# Patient Record
Sex: MALE | Race: WHITE | URBAN - NONMETROPOLITAN AREA
[De-identification: names, ages, dates, MRNs, and addresses within clinical notes are randomized per-mention and may not be internally consistent; named-entity substitution may affect disease eponyms.]

---

## 2022-06-29 ENCOUNTER — OFFICE VISIT (OUTPATIENT)
Dept: ENT CLINIC | Age: 52
End: 2022-06-29
Payer: COMMERCIAL

## 2022-06-29 VITALS
OXYGEN SATURATION: 98 % | HEART RATE: 68 BPM | DIASTOLIC BLOOD PRESSURE: 80 MMHG | SYSTOLIC BLOOD PRESSURE: 132 MMHG | WEIGHT: 315 LBS | RESPIRATION RATE: 18 BRPM

## 2022-06-29 DIAGNOSIS — H93.13 TINNITUS, BILATERAL: ICD-10-CM

## 2022-06-29 DIAGNOSIS — R42 CERVICAL VERTIGO: Primary | ICD-10-CM

## 2022-06-29 PROCEDURE — 3017F COLORECTAL CA SCREEN DOC REV: CPT | Performed by: OTOLARYNGOLOGY

## 2022-06-29 PROCEDURE — 1036F TOBACCO NON-USER: CPT | Performed by: OTOLARYNGOLOGY

## 2022-06-29 PROCEDURE — G8421 BMI NOT CALCULATED: HCPCS | Performed by: OTOLARYNGOLOGY

## 2022-06-29 PROCEDURE — 99204 OFFICE O/P NEW MOD 45 MIN: CPT | Performed by: OTOLARYNGOLOGY

## 2022-06-29 PROCEDURE — G8427 DOCREV CUR MEDS BY ELIG CLIN: HCPCS | Performed by: OTOLARYNGOLOGY

## 2022-06-29 ASSESSMENT — ENCOUNTER SYMPTOMS
ALLERGIC/IMMUNOLOGIC NEGATIVE: 1
EYES NEGATIVE: 1
GASTROINTESTINAL NEGATIVE: 1
RESPIRATORY NEGATIVE: 1

## 2022-06-29 NOTE — PROGRESS NOTES
Review of Systems   Constitutional: Negative. HENT: Negative. Eyes: Negative. Respiratory: Negative. Cardiovascular: Negative. Gastrointestinal: Negative. Endocrine: Negative. Genitourinary: Negative. Musculoskeletal: Negative. Skin: Negative. Allergic/Immunologic: Negative. Neurological: Positive for dizziness and light-headedness. Negative for tremors, seizures, syncope, facial asymmetry, speech difficulty, weakness, numbness and headaches. Hematological: Negative. Psychiatric/Behavioral: Negative.

## 2022-06-29 NOTE — PROGRESS NOTES
[facility]  [address] [facility] [address]  Dept: MD Jillian Sampson 46 y.o. male     Patient presents with a chief complaint of Dizziness (symptoms off and on since 2016. Previously seeing Dr Chad Hayden. Patient states that symptoms have increased since Feb of this year. Symptoms increase with weather changes and rainy days. Evenings are also worse. ) and Temporomandibular Joint Pain (symptoms in right jaw, tingling in right temple)       /80   Pulse 68   Resp 18   Wt (!) 373 lb (169.2 kg)   SpO2 98%       History of Presenting Illness: The patient/caregiver reports a history of complaint with the following features: Onset: started several years ago  Timing: occurs with weather changed  Duration: brief  Quality: feels movement sensation, feels like fluid draining in ears, fluctuating hearing in both ears, but usually on left  Location: both ears  Severity: pain none  Alleviating factors: nothing gives relief, did not tolerate a diuretic due to worsened vertigo  Aggravating factors: changes in weather  Associated factors: he reports changes in vision, and fluttering or tingling sensation in right temple, associated nausea    He has seen other ENT who suggested Meniere's disease, but has not tolerated diuretic therapy or benefited from sodium dietary restriction. He reports a history of cervical injury and reduced range of motion. Review of systems covering 10 systems is reviewed as staff entry in other note and pertinent positives and negatives noted. No past medical history on file. No current outpatient medications on file. No Known Allergies   No past surgical history on file.    Social History     Socioeconomic History    Marital status: Unknown     Spouse name: Not on file    Number of children: Not on file    Years of education: Not on file    Highest education level: Not on file   Occupational History    Not on file   Tobacco Use    Smoking status: Never Smoker    Smokeless tobacco: Never Used   Substance and Sexual Activity    Alcohol use: Not on file    Drug use: Not on file    Sexual activity: Not on file   Other Topics Concern    Not on file   Social History Narrative    Not on file     Social Determinants of Health     Financial Resource Strain:     Difficulty of Paying Living Expenses: Not on file   Food Insecurity:     Worried About Running Out of Food in the Last Year: Not on file    Maranda of Food in the Last Year: Not on file   Transportation Needs:     Lack of Transportation (Medical): Not on file    Lack of Transportation (Non-Medical): Not on file   Physical Activity:     Days of Exercise per Week: Not on file    Minutes of Exercise per Session: Not on file   Stress:     Feeling of Stress : Not on file   Social Connections:     Frequency of Communication with Friends and Family: Not on file    Frequency of Social Gatherings with Friends and Family: Not on file    Attends Christianity Services: Not on file    Active Member of [address] [place] [address] or Organizations: Not on file    Attends Club or Organization Meetings: Not on file    Marital Status: Not on file   Intimate Partner Violence:     Fear of Current or Ex-Partner: Not on file    Emotionally Abused: Not on file    Physically Abused: Not on file    Sexually Abused: Not on file   Housing Stability:     Unable to Pay for Housing in the Last Year: Not on file    Number of [place] in the Last Year: Not on file    Unstable Housing in the Last Year: Not on file     No family history on file.      PHYSICAL EXAM:    The patient was examined today 6/29/2022 with findings as follows:    CONSTITUTIONAL:    General Appearance: well-appearing, nontoxic, alert, no acute distress     Communication: understanding at normal conversational tones, normal voicing, speech intelligible    HEAD/FACE:    Head: atraumatic, normocephalic, no lesions    Facial Inspection: no lesions, healthy skin    Facial Strength: motor strength normal, symmetric strength, symmetric movement, motor strength    Sinuses: no sinus tenderness    Salivary Glands: no enlargements of parotid glands, no tenderness of parotid glands, no masses of parotid glands, clear salivary flow on palpation from Stensen's ducts, no duct stones of Stensen's duct, no enlargement of submandibular glands, no tenderness of submandibular glands, no masses of submandibular glands, clear salivary flow from [place]'s ducts, no stones of Americo's ducts    Temporomandibular Joint: no crepitus with motion, no tenderness on palpation, no trismus, motion symmetric    EYES:    Pupils: PERRLA, extra-ocular movements intact, no nystagmus, sclera white, no redness of eyes, no watering of eyes    EARS:    Bilateral External Ears: no pits, no tags    Right External Ear: normally formed, no lesions, no mastoid tenderness    Left External Ear: normally formed, no lesions, no mastoid tenderness    Right External Auditory Canal: normal, healthy skin, no obstructing cerumen, no discharge    Left External Auditory Canal: normal, healthy skin, no obstructing cerumen, no discharge    Right Tympanic Membrane: normal landmarks, translucent, mobile to pneumatic otoscopy, no perforation    Left Tympanic Membrane: normal landmarks, translucent, mobile to pneumatic otoscopy, no perforation    Hearing: intact to spoken voice, intact to finger rub, Brown midline, Right Ear: Rinne AC>BC, Left Left Ear: Rinne AC>BC    NOSE:    Nasal Skin: no lesions, no lacerations, no scars    Nasal Dorsum: symmetric with no visible or palpable deformities    Nasal Tip: normal symmetric nasal tip, normal nasal valves    Nasal Mucosa: normal, pink and moist    Septum: not markedly deformed, midline, no exposed vessels, no bleeding, no septal granuloma    Turbinates: normal size and conformation    Nasopharynx: normal    ORAL CAVITY/MOUTH:    Lips, teeth, gums: normal lips, normal gums, dentition intact, no dental pain on palpation    Oral Mucosa: normal, moist, no lesions    Palate: normal hard palate, normal soft palate, symmetric palatal elevation    Floor of Mouth: normal floor of mouth    Tongue: normal tongue, no lesions, no edema, no masses, normal mucosa, mobile    Tonsils: normal tonsils, symmetric, no lesions    Posterior pharynx: normal    HYPOPHARYNX/LARYNX:    Hypopharynx: normal hypopharynx, normal tongue base, normal pyriform sinus, normal vallecula    Larynx: normal epiglottis, normal false vocal cords, normal true vocal cords, normal glottic mobility, no arytenoid edema, no post-cricoid edema, no subglottic stenosis    NECK:    Neck: no masses, trachea midline, severe restriction of range of motion, no cysts or pits, no tenderness to palpation    Thyroid: normal thyroid, no enlargement, no tenderness, no nodules    LYMPH NODES:    Cervical: no palpable lymph node enlargement    RESPIRATORY:    Inspection/Auscultation: good air movement, chest expands symmetrically, normal breath sounds, no wheezing, no stridor    CARDIOVASCULAR SYSTEM:    Auscultation: regular rate and rhythm, carotid pulse normal, no carotid thrills, no carotid bruits    Observation/Palpation of Peripheral Vascular System: no varicosities, no cyanosis, no edema    ABDOMEN:    Inspection and Palpation: abdomen soft, non-tender, non-distended    MUSCULOSKELETAL SYSTEM:    Musculoskeletal System: extremity range of motion intact, strength symmetric and full upper extremities, strength symmetric and full lower extremities    SKIN:    General Appearance: no lesions, warm and dry, normal turgor, no bruising    NEUROLOGICAL SYSTEM:    Orientation: oriented to time, oriented to place, oriented to person    Cranial Nerves: Cranial Nerves II-XII intact, normal facial movement, normal facial sensation to touch, normal corneal reflex, normal shoulder shrug, normal detection of odorants and normal laryngeal elevation    Cerebellar: normal gait, normal finger-nose pointing and negative romberg's sign    Vestibulocular: Franklin-Hallpike testing negative, Fukuda step test normal, no ocular pursuit defects, and no head-shaking test induced nystagmus    PSYCHIATRIC:    Mood and affect: normal mood, normal affect          Assessment and Plan:    He has a rather significant restriction of cervical range of motion. I feel that this is the most likely cause of his constellation of symptoms including barometric and position triggered vertigo, scalp pain and tingling, and neck pain. He has been working chiropractor and reports that cervical imagine in the past showed abnormality of his cervical spine. I do not see signs of BPPV, Meniere's disease, or ocular pursuit abnormality to account for his symptoms. Migraine variant vertigo may also be considered, but given his cervical findings. cervicogenic vertigo is favored in this setting. Audiometric testing testing shows normal hearing and tympanometry, and again not suggestive of Meniere's or other otologic disease. I have advised the patient and/or caregiver that the symptoms and exam findings are most consistent with vertigo of uncertain cause. We have discussed that the symptoms can arise from abnormalities of the balance organ of the ear, vision impairment, poor coordination of movements by the brain, weakness or reduced sensation of the extremities, or other causes related to metabolic abnormalities or circulatory problems. We have discussed that other evaluation may be needed and that ongoing follow-up is recommended. The patient and/or caregiver is to notify the office if no improvement or worsening of symptoms is noted prior to the scheduled follow-up for sooner evaluation.   We have discussed that physical therapy for vestibular rehabilitation can sometimes be helpful when no other treatable cause is identified. The patient and/or caregiver is able to state an understanding of these recommendations and is agreeable to the treatment plan. The causes of tinnitus are discussed in the context of the patient's history and exam findings. I have discussed the management of tinnitus with the avoidance of silence and the use of background noise for suppression such as a clock radio set between stations, a fan, television, or other sound generating device particularly if the tinnitus is causing disturbance of sleep. We have discussed that high doses of aspirin and related drugs, caffeine and other stimulants, alcohol use, and exposure to excess environmental noise can result in worsened symptoms. We have discussed that there are a number of herbal and holistic remedies available and that although no proven benefit has been shown, most are harmless, and a trial if desired may be considered. The patient and/or caregiver is to notify the office if no improvement or worsening of symptoms is noted prior to the scheduled follow-up for sooner evaluation. The patient and/or caregiver is able to state an understanding of these recommendations and is agreeable to the treatment plan. Diagnosis Orders   1. Cervical vertigo     2. Tinnitus, bilateral  Ambulatory referral to Audiology      Return if symptoms worsen or fail to improve. The patient and/or caregiver is to notify the office if no improvement or worsening of symptoms is noted prior to the scheduled follow-up for sooner evaluation. The patient and/or caregiver is able to state an understanding of these recommendations and is agreeable to the treatment plan. --Ruby Josue MD on 6/29/2022 at 9:57 AM    An electronic signature was used to authenticate this note.

## 2023-10-18 ENCOUNTER — OFFICE VISIT (OUTPATIENT)
Dept: ORTHOPEDIC SURGERY | Facility: CLINIC | Age: 53
End: 2023-10-18
Payer: COMMERCIAL

## 2023-10-18 ENCOUNTER — ANCILLARY PROCEDURE (OUTPATIENT)
Dept: RADIOLOGY | Facility: CLINIC | Age: 53
End: 2023-10-18
Payer: COMMERCIAL

## 2023-10-18 DIAGNOSIS — M25.562 LEFT KNEE PAIN, UNSPECIFIED CHRONICITY: ICD-10-CM

## 2023-10-18 DIAGNOSIS — M17.12 PRIMARY OSTEOARTHRITIS OF LEFT KNEE: Primary | ICD-10-CM

## 2023-10-18 PROCEDURE — 73562 X-RAY EXAM OF KNEE 3: CPT | Mod: LEFT SIDE | Performed by: RADIOLOGY

## 2023-10-18 PROCEDURE — 99204 OFFICE O/P NEW MOD 45 MIN: CPT | Performed by: STUDENT IN AN ORGANIZED HEALTH CARE EDUCATION/TRAINING PROGRAM

## 2023-10-18 PROCEDURE — 20610 DRAIN/INJ JOINT/BURSA W/O US: CPT | Performed by: STUDENT IN AN ORGANIZED HEALTH CARE EDUCATION/TRAINING PROGRAM

## 2023-10-18 PROCEDURE — 73562 X-RAY EXAM OF KNEE 3: CPT | Mod: LT,FY

## 2023-10-18 RX ORDER — TRIAMCINOLONE ACETONIDE 40 MG/ML
40 INJECTION, SUSPENSION INTRA-ARTICULAR; INTRAMUSCULAR
Status: COMPLETED | OUTPATIENT
Start: 2023-10-18 | End: 2023-10-18

## 2023-10-18 RX ORDER — LIDOCAINE HYDROCHLORIDE 10 MG/ML
4 INJECTION INFILTRATION; PERINEURAL
Status: COMPLETED | OUTPATIENT
Start: 2023-10-18 | End: 2023-10-18

## 2023-10-18 RX ADMIN — LIDOCAINE HYDROCHLORIDE 4 ML: 10 INJECTION INFILTRATION; PERINEURAL at 10:33

## 2023-10-18 RX ADMIN — TRIAMCINOLONE ACETONIDE 40 MG: 40 INJECTION, SUSPENSION INTRA-ARTICULAR; INTRAMUSCULAR at 10:33

## 2023-10-18 ASSESSMENT — PAIN - FUNCTIONAL ASSESSMENT: PAIN_FUNCTIONAL_ASSESSMENT: 0-10

## 2023-10-18 ASSESSMENT — PAIN DESCRIPTION - DESCRIPTORS: DESCRIPTORS: BURNING;DISCOMFORT

## 2023-10-18 ASSESSMENT — PAIN SCALES - GENERAL: PAINLEVEL_OUTOF10: 6

## 2023-10-18 NOTE — PROGRESS NOTES
Chief Complaint   Patient presents with    Left Knee - Pain       HPI  ***      The patient's past medical history, family history, social history, and review of systems were documented on the patient's medical intake form.  The medical intake form was reviewed and scanned into the electronic medical record for future use.  History is otherwise negative except as stated in the HPI.    Physical exam    General: Alert and oriented to place, person, and time.  No acute distress and breathing comfortably; pleasant and cooperative with the examination.  HEENT: Head is normocephalic and atraumatic.  Neck: Supple, no visible swelling.  Cardiovascular: Good perfusion to the affected extremity.  Lungs: No audible wheezing or labored breathing.  Abdomen: Nondistended  HEME/Lymph : No visible abnormalities bilateral lower extremity    Extremity:  [exam]    Diagnostics:  No results found.     Procedure  L Inj/Asp: L knee on 10/18/2023 10:27 AM  Indications: pain  Details: 22 G needle, anterolateral approach  Medications: 40 mg triamcinolone acetonide 40 mg/mL; 4 mL lidocaine 10 mg/mL (1 %)  Consent was given by the patient. Immediately prior to procedure a time out was called to verify the correct patient, procedure, equipment, support staff and site/side marked as required. Patient was prepped and draped in the usual sterile fashion.              Assessment:  1. Left knee pain, unspecified chronicity  ***  - XR knee left 3 views; Future       Treatment plan:  The natural history of the condition and its associated treatment alternatives including surgical and nonsurgical options were discussed with the patient at length.  ***  All of the patient's questions were answered.

## 2023-10-18 NOTE — PROGRESS NOTES
History of Present Illness  Chief Complaint   Patient presents with    Left Knee - Pain       The patient presents with side: left knee pain for  several years .  The patient complains of worsening pain over the past 2 months.  Recently there has been concern for falls and instability.  There is increasing difficulty with activities of daily living and significant disability related to the knee pain.  The patient endorses the following non-operative treatments: Rest, ice, elevation and Injections.   There is increasing frustration with persistent pain and swelling and decreasing distance of ambulation.  The patient has difficulty with stairs as well as getting up from the floor.  The patient has difficulty putting on their socks and shoes as well as getting in and out of a car.    Patient works as a  ambulates quite a bit.  He has had cortisone injections in the past which did provide him quite a bit of relief hoping that he can get another 1 today    History reviewed. No pertinent past medical history.    Medication Documentation Review Audit       Reviewed by Rosio Owens on 10/18/23 at 1006      Medication Order Taking? Sig Documenting Provider Last Dose Status            No Medications to Display                                   Not on File    Social History     Socioeconomic History    Marital status:      Spouse name: Not on file    Number of children: Not on file    Years of education: Not on file    Highest education level: Not on file   Occupational History    Not on file   Tobacco Use    Smoking status: Never    Smokeless tobacco: Never   Vaping Use    Vaping Use: Not on file   Substance and Sexual Activity    Alcohol use: Not on file    Drug use: Not on file    Sexual activity: Not on file   Other Topics Concern    Not on file   Social History Narrative    Not on file     Social Determinants of Health     Financial Resource Strain: Not on file   Food Insecurity: Not on file  "  Transportation Needs: Not on file   Physical Activity: Not on file   Stress: Not on file   Social Connections: Not on file   Intimate Partner Violence: Not on file   Housing Stability: Not on file       Past Surgical History:   Procedure Laterality Date    OTHER SURGICAL HISTORY  11/12/2019    Cyst excision    OTHER SURGICAL HISTORY  11/12/2019    Gallbladder surgery    OTHER SURGICAL HISTORY  11/12/2019    Colonoscopy         Pain is described as aching    Location: medial  Pain level: 5  Assistive device: is not using any ambulatory assistive devices  History of surgery: None       Review of Systems   GENERAL: Negative for malaise, significant weight loss, fever  MUSCULOSKELETAL: see HPI  NEURO:  Negative     Exam  side: right Knee:  Skin healthy and intact  No gross swelling or ecchymosis  Alignment: mild varus     Effusion: mild        ROM: 0 and 125  mild Crepitus with range of motion  Tenderness to palpation over medial and lateral joint line and with patellar compression      No laxity to valgus stress  No laxity to varus stress  Negative Lachman´s test  Negative posterior drawer test  Mild pain with Raza´s test  Logrolling of hip negative  No pain with internal rotation of the hip  Straight leg raise negative  Neurovascular exam normal distally  2+ DP pulse and good cap refill     Radiographs  Electrocardiogram 12 Lead  \"Please see physician note for formal interpretation confirmed by Scribe\"  Confirmed by CYNTHIA GARCIA ([id number]) on 4/13/2022 1:44:18 PM    L Inj/Asp: L knee on 10/18/2023 10:33 AM  Indications: pain  Details: 22 G needle, anterolateral approach  Medications: 40 mg triamcinolone acetonide 40 mg/mL; 4 mL lidocaine 10 mg/mL (1 %)  Consent was given by the patient. Immediately prior to procedure a time out was called to verify the correct patient, procedure, equipment, support staff and site/side marked as required. Patient was prepped and draped in the usual sterile fashion.       Injection " was performed as detailed in the procedure note below.     Injection Procedure Note    Before aspiration/injection, the risks of this procedure including but not limited to; infection, local skin irritation, skin atrophy/skin pigmentation changes, calcification, continued pain or discomfort, elevated blood sugar, burning, failure to relieve pain were all discussed with the patient.  Patient verbalized understanding and consented to the procedure.    After informed consent was provided, patient identification was confirmed, and all allergies were verified, the patient was appropriately positioned.  The site was marked and timeout performed.  The injection site was prepped in the usual sterile orthopedic manner with a combination of betadine and alcohol wipes to provide a sterile environment.  Skin was anesthetized with ethyl chloride spray.  The injection was performed with standard technique via the superolateral knee.  The needle was withdrawn and the puncture site was secured with a Band-Aid.  The patient tolerated the procedure well without complication.    Post procedure discomfort can be alleviated with additional medication, ice, elevation, and rest over the first 24 hours is recommended.    The injection site was LEFT knee joint, medication 4cc of 1% lidocaine, 1cc of 40mg kenalog.       Assessment  Osteoarthrosis side: right knee mild to moderate     Plan  We discussed with the patient the diagnosis of degenerative joint disease of the knee.  We reviewed an evidence-based approach to osteoarthritis of the knee.  We strongly encouraged low-impact aerobic activity and non-opioid analgesics.     We discussed temporary pain relief with corticosteroid injections and the associated risks.  We also discussed the conflicting evidence regarding viscosupplementation and potential long-term risks with NSAID´s.  We reviewed the role of bracing for instability and physical therapy for atrophy and gait abnormalities.  We  reviewed that the only curative process is for a joint arthroplasty.  The patient elected for Injections    I will see them in 2 month for recheck, sooner if there is any problems.  Questions were answered.

## 2023-12-08 ENCOUNTER — OFFICE VISIT (OUTPATIENT)
Dept: ORTHOPEDIC SURGERY | Facility: CLINIC | Age: 53
End: 2023-12-08
Payer: COMMERCIAL

## 2023-12-08 DIAGNOSIS — M25.572 ACUTE LEFT ANKLE PAIN: ICD-10-CM

## 2023-12-08 DIAGNOSIS — M17.12 PRIMARY OSTEOARTHRITIS OF LEFT KNEE: Primary | ICD-10-CM

## 2023-12-08 PROBLEM — R00.2 PALPITATIONS: Status: ACTIVE | Noted: 2023-12-08

## 2023-12-08 PROBLEM — R42 CERVICAL VERTIGO: Status: ACTIVE | Noted: 2022-06-29

## 2023-12-08 PROBLEM — R93.1 AGATSTON CAC SCORE 100-199: Status: ACTIVE | Noted: 2023-12-08

## 2023-12-08 PROBLEM — E66.01 MORBID OBESITY (MULTI): Status: ACTIVE | Noted: 2023-12-08

## 2023-12-08 PROBLEM — R42 ORTHOSTATIC DIZZINESS: Status: ACTIVE | Noted: 2023-12-08

## 2023-12-08 PROBLEM — F41.8 ANXIETY ABOUT HEALTH: Status: ACTIVE | Noted: 2023-08-15

## 2023-12-08 PROBLEM — R06.02 EXERTIONAL SHORTNESS OF BREATH: Status: ACTIVE | Noted: 2023-12-08

## 2023-12-08 PROBLEM — M25.562 LEFT KNEE PAIN: Status: ACTIVE | Noted: 2023-12-08

## 2023-12-08 PROBLEM — K57.90 DIVERTICULOSIS: Status: ACTIVE | Noted: 2023-08-15

## 2023-12-08 PROBLEM — G47.33 OSA (OBSTRUCTIVE SLEEP APNEA): Status: ACTIVE | Noted: 2023-08-15

## 2023-12-08 PROBLEM — R60.0 EDEMA LEG: Status: ACTIVE | Noted: 2023-12-08

## 2023-12-08 PROBLEM — R06.83 PRIMARY SNORING: Status: ACTIVE | Noted: 2023-12-08

## 2023-12-08 PROBLEM — R53.83 FATIGUE: Status: ACTIVE | Noted: 2023-12-08

## 2023-12-08 PROBLEM — E55.9 VITAMIN D DEFICIENCY: Status: ACTIVE | Noted: 2023-08-15

## 2023-12-08 PROBLEM — H93.13 TINNITUS OF BOTH EARS: Status: ACTIVE | Noted: 2022-06-29

## 2023-12-08 PROBLEM — R94.31 EKG, ABNORMAL: Status: ACTIVE | Noted: 2023-12-08

## 2023-12-08 PROBLEM — H90.3 SENSORINEURAL HEARING LOSS, BILATERAL: Status: ACTIVE | Noted: 2023-08-15

## 2023-12-08 PROBLEM — K22.70 BARRETT'S ESOPHAGUS: Status: ACTIVE | Noted: 2023-08-15

## 2023-12-08 PROBLEM — J30.2 SEASONAL ALLERGIC RHINITIS: Status: ACTIVE | Noted: 2023-08-15

## 2023-12-08 PROBLEM — K21.9 CHRONIC GERD: Status: ACTIVE | Noted: 2023-12-08

## 2023-12-08 PROBLEM — I10 HYPERTENSION: Status: ACTIVE | Noted: 2023-12-08

## 2023-12-08 PROCEDURE — 1036F TOBACCO NON-USER: CPT | Performed by: ORTHOPAEDIC SURGERY

## 2023-12-08 PROCEDURE — 99213 OFFICE O/P EST LOW 20 MIN: CPT | Performed by: ORTHOPAEDIC SURGERY

## 2023-12-08 RX ORDER — FAMOTIDINE 20 MG/1
1 TABLET, FILM COATED ORAL NIGHTLY
COMMUNITY
Start: 2022-03-24

## 2023-12-08 RX ORDER — MAGNESIUM CARB/ALUMINUM HYDROX 105-160MG
TABLET,CHEWABLE ORAL
COMMUNITY
Start: 2022-03-24

## 2023-12-08 RX ORDER — CEPHALEXIN 500 MG/1
500 CAPSULE ORAL 4 TIMES DAILY
COMMUNITY
Start: 2023-03-22 | End: 2023-04-01

## 2023-12-08 RX ORDER — PANTOPRAZOLE SODIUM 20 MG/1
TABLET, DELAYED RELEASE ORAL
COMMUNITY

## 2023-12-08 RX ORDER — AMOXICILLIN 500 MG/1
CAPSULE ORAL
COMMUNITY
Start: 2023-01-05

## 2023-12-08 RX ORDER — FLUTICASONE PROPIONATE 50 MCG
1 SPRAY, SUSPENSION (ML) NASAL
COMMUNITY
Start: 2023-08-15 | End: 2024-08-14

## 2023-12-08 RX ORDER — IBUPROFEN 200 MG
TABLET ORAL
COMMUNITY

## 2023-12-08 RX ORDER — ALBUTEROL SULFATE 90 UG/1
AEROSOL, METERED RESPIRATORY (INHALATION)
COMMUNITY
Start: 2023-04-04

## 2023-12-08 RX ORDER — AZITHROMYCIN 250 MG/1
TABLET, FILM COATED ORAL
COMMUNITY
Start: 2023-05-18

## 2023-12-08 ASSESSMENT — PAIN - FUNCTIONAL ASSESSMENT: PAIN_FUNCTIONAL_ASSESSMENT: NO/DENIES PAIN

## 2023-12-08 NOTE — PROGRESS NOTES
History of Present Illness  No chief complaint on file.      Patient with known osteoarthritis of the side: left knee who presents today for repeat evaluation.  The patient notes improving knee pain.  The patient notes  no  mechanical symptoms.  The patient has tried the following modalities Injections.    He also had left ankle pain which has resolved as well.    No past medical history on file.    Medication Documentation Review Audit       Reviewed by Rosio Owens on 10/18/23 at 1006      Medication Order Taking? Sig Documenting Provider Last Dose Status            No Medications to Display                                   Not on File    Social History     Socioeconomic History    Marital status:      Spouse name: Not on file    Number of children: Not on file    Years of education: Not on file    Highest education level: Not on file   Occupational History    Not on file   Tobacco Use    Smoking status: Never    Smokeless tobacco: Never   Vaping Use    Vaping Use: Not on file   Substance and Sexual Activity    Alcohol use: Not on file    Drug use: Not on file    Sexual activity: Not on file   Other Topics Concern    Not on file   Social History Narrative    Not on file     Social Determinants of Health     Financial Resource Strain: Not on file   Food Insecurity: Not on file   Transportation Needs: Not on file   Physical Activity: Not on file   Stress: Not on file   Social Connections: Not on file   Intimate Partner Violence: Not on file   Housing Stability: Not on file       Past Surgical History:   Procedure Laterality Date    OTHER SURGICAL HISTORY  11/12/2019    Cyst excision    OTHER SURGICAL HISTORY  11/12/2019    Gallbladder surgery    OTHER SURGICAL HISTORY  11/12/2019    Colonoscopy            Review of Systems   GENERAL: Negative for malaise, significant weight loss, fever  MUSCULOSKELETAL: see HPI  NEURO:  Negative        Exam  side: left Knee:  Skin healthy and intact  No gross swelling  or ecchymosis  Alignment: normal  Effusion:  None  ROM:  0-130 degrees  No crepitance with range of motion  No pain with internal rotation of the hip  Tenderness to palpation:  None  Pain with patellar compression: No  No laxity to valgus stress  No laxity to varus stress  Negative Lachman´s test  Negative posterior drawer test  negative Raza´s test    Left ankle:  No effusion, erythema or warmth  No tenderness to palpation about the medial or lateral malleolus or along the ligamentous structures  Full ankle dorsiflexion, plantarflexion, inversion and eversion without pain     Neurovascular exam normal distally  2+ DP pulse and good cap refill     Imaging  XR knee left 3 views  Narrative: Interpreted By:  Garima Roche,   STUDY:  Left knee, 3 views.      INDICATION:  Signs/Symptoms:Pian.      COMPARISON:  02/22/2020.      ACCESSION NUMBER(S):  [id number]      ORDERING CLINICIAN:  OLAYINKA NEGRO      FINDINGS:  No acute fracture or malalignment.  Joint spaces are maintained.  Chondrocalcinosis noted which can be associated with CPPD arthropathy.  Chronic corticated fragmentation of the tibial tubercle likely  representing sequela of chronic Osgood-Schlatter disease.  Redemonstration of similar sclerosis of the posterior distal femur  likely representing chronic osteonecrosis.      Impression: 1. As above.      MACRO:  None.      Signed by: Garima Roche 10/20/2023 5:56 AM  Dictation workstation:   [id number]         Assessment  Patient with known osteoarthritis of the side: left knee, stable  Left ankle pain resolved     Plan  We reviewed an evidence-based approach to OA of the knee.  We discussed past treatments as well options for future treatment.  Resume activities as tolerated if he is having a bad day or 2 he can try over-the-counter anti-inflammatory medications  Observe left ankle  Follow-up as needed  All questions were answered

## 2023-12-20 ENCOUNTER — APPOINTMENT (OUTPATIENT)
Dept: ORTHOPEDIC SURGERY | Facility: CLINIC | Age: 53
End: 2023-12-20
Payer: COMMERCIAL